# Patient Record
Sex: FEMALE | Race: WHITE | URBAN - METROPOLITAN AREA
[De-identification: names, ages, dates, MRNs, and addresses within clinical notes are randomized per-mention and may not be internally consistent; named-entity substitution may affect disease eponyms.]

---

## 2024-02-06 ENCOUNTER — OFFICE VISIT (OUTPATIENT)
Dept: ORTHOPEDIC SURGERY | Age: 69
End: 2024-02-06
Payer: MEDICARE

## 2024-02-06 VITALS
BODY MASS INDEX: 34.53 KG/M2 | SYSTOLIC BLOOD PRESSURE: 116 MMHG | WEIGHT: 220 LBS | HEART RATE: 78 BPM | RESPIRATION RATE: 14 BRPM | HEIGHT: 67 IN | DIASTOLIC BLOOD PRESSURE: 80 MMHG | OXYGEN SATURATION: 98 %

## 2024-02-06 DIAGNOSIS — S63.501A SPRAIN OF RIGHT WRIST, INITIAL ENCOUNTER: Primary | ICD-10-CM

## 2024-02-06 PROCEDURE — G8427 DOCREV CUR MEDS BY ELIG CLIN: HCPCS

## 2024-02-06 PROCEDURE — 3017F COLORECTAL CA SCREEN DOC REV: CPT

## 2024-02-06 PROCEDURE — G8417 CALC BMI ABV UP PARAM F/U: HCPCS

## 2024-02-06 PROCEDURE — G8484 FLU IMMUNIZE NO ADMIN: HCPCS

## 2024-02-06 PROCEDURE — G8400 PT W/DXA NO RESULTS DOC: HCPCS

## 2024-02-06 PROCEDURE — 1123F ACP DISCUSS/DSCN MKR DOCD: CPT

## 2024-02-06 PROCEDURE — 1036F TOBACCO NON-USER: CPT

## 2024-02-06 PROCEDURE — 1090F PRES/ABSN URINE INCON ASSESS: CPT

## 2024-02-06 PROCEDURE — 99203 OFFICE O/P NEW LOW 30 MIN: CPT

## 2024-02-06 ASSESSMENT — ENCOUNTER SYMPTOMS
FACIAL SWELLING: 0
COUGH: 0
NAUSEA: 0
SHORTNESS OF BREATH: 0
BACK PAIN: 0
RHINORRHEA: 0

## 2024-02-06 NOTE — PROGRESS NOTES
Patient in office to discuss right wrist injury that happened on 02/03/2024. Patient states that the wrist is slowly getting better. New x-rays has been completed today.

## 2024-02-06 NOTE — PROGRESS NOTES
2024   Chief Complaint   Patient presents with    Wrist Pain     Right        History of Present Illness:                             Tierney Benavides is a 68 y.o. female presenting to the office today as a new patient with right wrist pain.  Patient states that 3 days ago she fell and caught herself with an outstretched hand.  She states she impacted the volar aspect of her wrist and had some pain that gave her cause for concern.  Patient states the last couple of days have shown some improvement in her range of motion and swelling.  She states there was some mild bruising that is starting to resolve throughout the wrist.  Patient denies any distal paresthesias or temperature changes.    Patient in office to discuss right wrist injury that happened on 2024. Patient states that the wrist is slowly getting better. New x-rays has been completed today.           Medical History  Patient's medications, allergies, past medical, surgical, social and family histories were reviewed and updated as appropriate.    Past Medical History:   Diagnosis Date    Hyperlipidemia      Past Surgical History:   Procedure Laterality Date     SECTION       No family history on file.  Social History     Socioeconomic History    Marital status:      Spouse name: None    Number of children: None    Years of education: None    Highest education level: None   Tobacco Use    Smoking status: Never     Passive exposure: Never   Substance and Sexual Activity    Alcohol use: No    Drug use: No     Current Outpatient Medications   Medication Sig Dispense Refill    pravastatin (PRAVACHOL) 40 MG tablet Take 1 tablet by mouth daily      HYDROcodone-acetaminophen (NORCO) 5-325 MG per tablet Take 1 tablet by mouth every 4 hours as needed for Pain. 20 tablet 0    aspirin 81 MG tablet Take 81 mg by mouth daily. (Patient not taking: Reported on 2024)       No current facility-administered medications for this visit.

## 2024-02-06 NOTE — PATIENT INSTRUCTIONS
Velcro wrist brace as needed  Use ace bandage for the right wrist  Continue weight-bearing as tolerated.  Continue range of motion exercises as instructed.  Ice and elevate as needed.  Tylenol or Motrin for pain.  Follow up in 4-6 weeks if you are still having pains within the wrist    We are committed to providing you the best care possible.  If you receive a survey after visiting one of our offices, please take time to share your experience concerning your physician office visit.  These surveys are confidential and no health information about you is shared.  We are eager to improve for you and we are counting on your feedback to help make that happen.

## 2024-05-02 ENCOUNTER — OFFICE VISIT (OUTPATIENT)
Dept: ORTHOPEDIC SURGERY | Age: 69
End: 2024-05-02
Payer: MEDICARE

## 2024-05-02 VITALS
WEIGHT: 221 LBS | RESPIRATION RATE: 14 BRPM | BODY MASS INDEX: 34.69 KG/M2 | OXYGEN SATURATION: 95 % | HEIGHT: 67 IN | HEART RATE: 72 BPM

## 2024-05-02 DIAGNOSIS — S63.501D SPRAIN OF RIGHT WRIST, SUBSEQUENT ENCOUNTER: Primary | ICD-10-CM

## 2024-05-02 PROCEDURE — 1036F TOBACCO NON-USER: CPT

## 2024-05-02 PROCEDURE — G8428 CUR MEDS NOT DOCUMENT: HCPCS

## 2024-05-02 PROCEDURE — G8417 CALC BMI ABV UP PARAM F/U: HCPCS

## 2024-05-02 PROCEDURE — 3017F COLORECTAL CA SCREEN DOC REV: CPT

## 2024-05-02 PROCEDURE — 99213 OFFICE O/P EST LOW 20 MIN: CPT

## 2024-05-02 PROCEDURE — G8400 PT W/DXA NO RESULTS DOC: HCPCS

## 2024-05-02 PROCEDURE — 1090F PRES/ABSN URINE INCON ASSESS: CPT

## 2024-05-02 PROCEDURE — 1123F ACP DISCUSS/DSCN MKR DOCD: CPT

## 2024-05-02 RX ORDER — LOSARTAN POTASSIUM 50 MG/1
1 TABLET ORAL DAILY
COMMUNITY

## 2024-05-02 ASSESSMENT — ENCOUNTER SYMPTOMS
FACIAL SWELLING: 0
COUGH: 0
RHINORRHEA: 0
BACK PAIN: 0
NAUSEA: 0
SHORTNESS OF BREATH: 0

## 2024-05-02 NOTE — PROGRESS NOTES
Patient is here for a follow up for her right wrist which has been sore and stiff since injury in February. Pain level 1/10. She states that she just wants to be sure that nothing else is going on and that symptoms are normal with injury.

## 2024-05-02 NOTE — PATIENT INSTRUCTIONS
Continue weight bear as tolerated  Ice and elevate as needed  Tylenol or Motrin for pain  Follow up as needed for any persistent or worsening pain.

## 2024-05-02 NOTE — PROGRESS NOTES
2024   Chief Complaint   Patient presents with    Wrist Pain     right        History of Present Illness:       Today's HPI:  Returning to the office today for continued management of right wrist pain.  Patient states that her wrist has improved a lot since her previous appointment.  She notes some continued discomfort with increased use and some stiffness at terminal flexion and extension.  She denies any distal paresthesias or temperature changes.    Previous HPI:                        Tierney Benavides is a 68 y.o. female presenting to the office today as a new patient with right wrist pain.  Patient states that 3 days ago she fell and caught herself with an outstretched hand.  She states she impacted the volar aspect of her wrist and had some pain that gave her cause for concern.  Patient states the last couple of days have shown some improvement in her range of motion and swelling.  She states there was some mild bruising that is starting to resolve throughout the wrist.  Patient denies any distal paresthesias or temperature changes.    Patient in office to discuss right wrist injury that happened on 2024. Patient states that the wrist is slowly getting better. New x-rays has been completed today.           Medical History  Patient's medications, allergies, past medical, surgical, social and family histories were reviewed and updated as appropriate.    Past Medical History:   Diagnosis Date    Hyperlipidemia      Past Surgical History:   Procedure Laterality Date     SECTION       No family history on file.  Social History     Socioeconomic History    Marital status:      Spouse name: None    Number of children: None    Years of education: None    Highest education level: None   Tobacco Use    Smoking status: Never     Passive exposure: Never   Substance and Sexual Activity    Alcohol use: No    Drug use: No     Current Outpatient Medications   Medication Sig Dispense Refill